# Patient Record
Sex: MALE | Race: BLACK OR AFRICAN AMERICAN
[De-identification: names, ages, dates, MRNs, and addresses within clinical notes are randomized per-mention and may not be internally consistent; named-entity substitution may affect disease eponyms.]

---

## 2021-09-18 ENCOUNTER — HOSPITAL ENCOUNTER (EMERGENCY)
Dept: HOSPITAL 7 - FB.ED | Age: 41
LOS: 1 days | Discharge: HOME | End: 2021-09-19
Payer: MEDICAID

## 2021-09-18 DIAGNOSIS — F17.210: ICD-10-CM

## 2021-09-18 DIAGNOSIS — I10: ICD-10-CM

## 2021-09-18 DIAGNOSIS — Z86.73: ICD-10-CM

## 2021-09-18 DIAGNOSIS — R10.32: Primary | ICD-10-CM

## 2021-09-18 PROCEDURE — 71046 X-RAY EXAM CHEST 2 VIEWS: CPT

## 2021-09-18 PROCEDURE — 74177 CT ABD & PELVIS W/CONTRAST: CPT

## 2021-09-18 PROCEDURE — 85025 COMPLETE CBC W/AUTO DIFF WBC: CPT

## 2021-09-18 PROCEDURE — 83880 ASSAY OF NATRIURETIC PEPTIDE: CPT

## 2021-09-18 PROCEDURE — 99285 EMERGENCY DEPT VISIT HI MDM: CPT

## 2021-09-18 PROCEDURE — 85379 FIBRIN DEGRADATION QUANT: CPT

## 2021-09-18 PROCEDURE — 84484 ASSAY OF TROPONIN QUANT: CPT

## 2021-09-18 PROCEDURE — 81001 URINALYSIS AUTO W/SCOPE: CPT

## 2021-09-18 PROCEDURE — 93005 ELECTROCARDIOGRAM TRACING: CPT

## 2021-09-18 PROCEDURE — 36415 COLL VENOUS BLD VENIPUNCTURE: CPT

## 2021-09-18 PROCEDURE — 71260 CT THORAX DX C+: CPT

## 2021-09-18 PROCEDURE — 80053 COMPREHEN METABOLIC PANEL: CPT

## 2021-09-18 NOTE — PCM.EKG
** #1 Interpretation


EKG Date: 09/18/21


Time: 22:17


EKG Interpretation Comments: 


Normal sinus rhythm, rate 77, normal axis, likely LVH, ST T-segment 

abnormalities in the inferior leads, nonpathological ST elevation in V1 and V2

## 2021-09-18 NOTE — EDM.PDOC
ED HPI GENERAL MEDICAL PROBLEM





- General


Chief Complaint: General


Stated Complaint: LEFT SIDE PAIN


Time Seen by Provider: 09/18/21 21:42


Source of Information: Reports: Patient


History Limitations: Reports: Language Barrier, Other (Patient is new to the 

area and is a relatively poor historian and cannot say exactly which medications

he takes)





- History of Present Illness


INITIAL COMMENTS - FREE TEXT/NARRATIVE: 


41-year-old gentleman new to the area came to the emergency department due to a 

2-day history of left sided abdominal/flank pain.  Past medical history is 

significant for a stroke 1 year ago that left him with weakness on the left side

of his body.  He states that he recently moved to the area and has been in the 

car quite a bit.  He also has been moving and lifting heavier objects than he 

normally does.  He states that the pain has been present for 2 days but was so 

severe last night that he did not sleep well.  He came to the emergency 

department today because he had difficulty sleeping and the pain has gotten 

actually worse.  Also complains of mild chest pain with difficulty breathing and

points to the midsternal area.  He denied fever, chills, upper respiratory 

symptoms, change in bowel or bladder habits.  He specifically denied dysuria and

hematuria.


He thinks that it is possible he may have strained or pulled a muscle but he is 

also concerned about blood clots.





  ** lateral side of the Left Lower Abdomen


Pain Score (Numeric/FACES): 8





- Related Data


                                    Allergies











Allergy/AdvReac Type Severity Reaction Status Date / Time


 


No Known Allergies Allergy   Verified 09/18/21 21:27











Home Meds: 


                                    Home Meds





. [Unable to Verify Home Med List]  09/18/21 [History]











Past Medical History


Cardiovascular History: Reports: Hypertension


Neurological History: Reports: CVA


Psychiatric History: Reports: Depression





Social & Family History





- Family History


Family Medical History: No Pertinent Family History





- Tobacco Use


Tobacco Use Status *Q: Current Every Day Tobacco User


Years of Tobacco use: 1


Packs/Tins Daily: 0.5





- Caffeine Use


Caffeine Use: Reports: Tea





- Recreational Drug Use


Recreational Drug Use: Yes


Recreational Drug Type: Reports: Marijuana/Hashish





ED ROS GENERAL





- Review of Systems


Review Of Systems: See Below


Constitutional: Reports: No Symptoms


HEENT: Reports: No Symptoms, Vertigo


Cardiovascular: Reports: Chest Pain


Endocrine: Reports: No Symptoms


GI/Abdominal: Reports: Abdominal Pain


: Reports: No Symptoms


Musculoskeletal: Reports: Muscle Pain


Skin: Reports: No Symptoms


Neurological: Reports: Pre-Existing Deficit, Weakness, Gait Disturbance


Psychiatric: Reports: No Symptoms


Hematologic/Lymphatic: Reports: No Symptoms


Immunologic: Reports: No Symptoms





ED EXAM, GENERAL





- Physical Exam


Exam: See Below


Exam Limited By: Language Barrier


General Appearance: Alert, Mild Distress


Eye Exam: Bilateral Eye: EOMI


Head: Atraumatic, Normocephalic


Respiratory/Chest: No Respiratory Distress, Lungs Clear


Cardiovascular: Regular Rate, Rhythm, No Murmur


GI/Abdominal: Normal Bowel Sounds, Tender


Back Exam: Normal Inspection.  No: CVA Tenderness (R), CVA Tenderness (L)


Extremities: Normal Inspection, No Pedal Edema


Neurological: Alert, Oriented, CN II-XII Intact, Normal Cognition, Abnormal 

Gait, Sensory/Motor Deficit, Other (Left-sided weakness)


Psychiatric: Normal Affect, Normal Mood


Skin Exam: Warm, Dry





Course





- Vital Signs


Text/Narrative:: 


Review of EKG shows normal sinus rhythm, rate 77, normal axis, likely LVH, 

nonpathological ST segment elevations in V1 and V2.  Patient was given 324 mg 

chewable aspirin.


Review of labs shows mildly elevated D-dimer and creatinine with GFR greater 

than 60.


You have chest x-ray shows no obvious acute process in the lungs, possible mild 

pulmonary congestion more prominent in the right lower lobe with cardiac size at

the upper limits of normal with prominent right ventricle.  Wells score of 3.


CT of chest, abdomen, pelvis showed no obvious acute abnormalities.


Chest x-ray did show more than normal air in the bowel as listed above.  

Patient's abdominal pain may be related to bowel gas.





Last Recorded V/S: 


                                Last Vital Signs











Temp  36.2 C   09/18/21 21:25


 


Pulse  87   09/18/21 21:25


 


Resp  15   09/18/21 21:25


 


BP  140/84   09/18/21 21:25


 


Pulse Ox  96   09/18/21 21:25














- Orders/Labs/Meds


Orders: 


                               Active Orders 24 hr











 Category Date Time Status


 


 CXR [Chest 2V] [CR] Stat Exams  09/18/21 22:00 Taken


 


 Chest Abdomen Pelvis w Cont [CT] Stat Exams  09/19/21 00:13 Taken


 


 Sodium Chloride 0.9% [Normal Saline] 1,000 ml Med  09/18/21 23:30 Active





 IV ASDIRECTED   


 


 EKG 12 Lead [EK] Routine Ther  09/18/21 21:59 Ordered








                                Medication Orders





Sodium Chloride (Normal Saline)  1,000 mls @ 999 mls/hr IV ASDIRECTED ALMAZ


   Last Admin: 09/18/21 23:23  Dose: 999 mls/hr


   Documented by: BLANKA








Labs: 


                                Laboratory Tests











  09/18/21 09/18/21 09/18/21 Range/Units





  22:10 22:10 22:10 


 


WBC   7.1   (3.2-10.1)  x10-3/uL


 


RBC   4.90   (3.90-5.90)  x10(6)uL


 


Hgb   14.7   (12.9-17.7)  g/dL


 


Hct   44.1   (38.3-50.1)  %


 


MCV   89.9   (80.8-98.7)  fL


 


MCH   29.9   (27.0-33.3)  pg


 


MCHC   33.3   (28.7-35.3)  g/dL


 


RDW   13.8   (12.4-15.0)  %


 


Plt Count   222   (117-477)  x10(3)uL


 


MPV   7.0   (6.7-11.0)  fL


 


Neut % (Auto)   69.4   (40.3-71.8)  %


 


Lymph % (Auto)   20.9   (15.8-45.3)  %


 


Mono % (Auto)   7.7   (5.5-15.2)  %


 


Eos % (Auto)   1.5   (0.1-6.8)  %


 


Baso % (Auto)   0.5   (0.3-3.8)  %


 


Neut # (Auto)   4.9   (1.7-6.9)  x10-3/uL


 


Lymph # (Auto)   1.5   (0.5-4.5)  x10-3/uL


 


Mono # (Auto)   0.5   (0.0-1.2)  x10-3/uL


 


Eos # (Auto)   0.1   (0.0-0.6)  x10-3/uL


 


Baso # (Auto)   0.0   (0.0-0.3)  x10-3/uL


 


D-Dimer, Quantitative  0.69 H    (0.0-0.59)  mg/LFEU


 


Sodium    141  (135-145)  mmol/L


 


Potassium    3.7  (3.5-5.3)  mmol/L


 


Chloride    102  (100-110)  mmol/L


 


Carbon Dioxide    31  (21-32)  mmol/L


 


BUN    17  (7-18)  mg/dL


 


Creatinine    1.4 H  (0.70-1.30)  mg/dL


 


Est Cr Clr Drug Dosing    62.66  mL/min


 


Estimated GFR (MDRD)    > 60  (>60)  


 


BUN/Creatinine Ratio    12.1  (9-20)  


 


Glucose    117 H  ()  mg/dL


 


Calcium    9.4  (8.6-10.2)  mg/dL


 


Total Bilirubin    0.6  (0.1-1.3)  mg/dL


 


AST    15  (5-25)  IU/L


 


ALT    43 H  (12-36)  U/L


 


Alkaline Phosphatase    76  ()  IU/L


 


Troponin I     (4.0-60.3)  pg/mL


 


NT-Pro-B Natriuret Pep     (<=125)  pg/mL


 


Total Protein    7.8  (6.0-8.0)  g/dL


 


Albumin    3.8  (3.5-5.2)  g/dL


 


Globulin    4.0  g/dL


 


Albumin/Globulin Ratio    1.0  


 


Urine Color     (YELLOW)  


 


Urine Appearance     (CLEAR)  


 


Urine pH     (5.0-6.5)  


 


Ur Specific Gravity     (1.010-1.025)  


 


Urine Protein     (NEGATIVE)  mg/dL


 


Urine Glucose (UA)     (NORMAL)  mg/dL


 


Urine Ketones     (NEGATIVE)  mg/dL


 


Urine Occult Blood     (NEGATIVE)  


 


Urine Nitrite     (NEGATIVE)  


 


Urine Bilirubin     (NEGATIVE)  


 


Urine Urobilinogen     (NEGATIVE)  mg/dL


 


Ur Leukocyte Esterase     (NEGATIVE)  


 


U Hyaline Cast (Auto)     (NS)  


 


Urine RBC     (0-5)  


 


Urine WBC     (0-5)  


 


Ur Squamous Epith Cells     (NS,R,O)  


 


Urine Bacteria     (NS)  


 


Urine Mucus     (NS)  














  09/18/21 09/18/21 Range/Units





  22:10 22:10 


 


WBC    (3.2-10.1)  x10-3/uL


 


RBC    (3.90-5.90)  x10(6)uL


 


Hgb    (12.9-17.7)  g/dL


 


Hct    (38.3-50.1)  %


 


MCV    (80.8-98.7)  fL


 


MCH    (27.0-33.3)  pg


 


MCHC    (28.7-35.3)  g/dL


 


RDW    (12.4-15.0)  %


 


Plt Count    (117-477)  x10(3)uL


 


MPV    (6.7-11.0)  fL


 


Neut % (Auto)    (40.3-71.8)  %


 


Lymph % (Auto)    (15.8-45.3)  %


 


Mono % (Auto)    (5.5-15.2)  %


 


Eos % (Auto)    (0.1-6.8)  %


 


Baso % (Auto)    (0.3-3.8)  %


 


Neut # (Auto)    (1.7-6.9)  x10-3/uL


 


Lymph # (Auto)    (0.5-4.5)  x10-3/uL


 


Mono # (Auto)    (0.0-1.2)  x10-3/uL


 


Eos # (Auto)    (0.0-0.6)  x10-3/uL


 


Baso # (Auto)    (0.0-0.3)  x10-3/uL


 


D-Dimer, Quantitative    (0.0-0.59)  mg/LFEU


 


Sodium    (135-145)  mmol/L


 


Potassium    (3.5-5.3)  mmol/L


 


Chloride    (100-110)  mmol/L


 


Carbon Dioxide    (21-32)  mmol/L


 


BUN    (7-18)  mg/dL


 


Creatinine    (0.70-1.30)  mg/dL


 


Est Cr Clr Drug Dosing    mL/min


 


Estimated GFR (MDRD)    (>60)  


 


BUN/Creatinine Ratio    (9-20)  


 


Glucose    ()  mg/dL


 


Calcium    (8.6-10.2)  mg/dL


 


Total Bilirubin    (0.1-1.3)  mg/dL


 


AST    (5-25)  IU/L


 


ALT    (12-36)  U/L


 


Alkaline Phosphatase    ()  IU/L


 


Troponin I  5.9   (4.0-60.3)  pg/mL


 


NT-Pro-B Natriuret Pep  24   (<=125)  pg/mL


 


Total Protein    (6.0-8.0)  g/dL


 


Albumin    (3.5-5.2)  g/dL


 


Globulin    g/dL


 


Albumin/Globulin Ratio    


 


Urine Color   Yellow  (YELLOW)  


 


Urine Appearance   Slightly cloudy  (CLEAR)  


 


Urine pH   5.0  (5.0-6.5)  


 


Ur Specific Gravity   1.030 H  (1.010-1.025)  


 


Urine Protein   Negative  (NEGATIVE)  mg/dL


 


Urine Glucose (UA)   Normal  (NORMAL)  mg/dL


 


Urine Ketones   Negative  (NEGATIVE)  mg/dL


 


Urine Occult Blood   Negative  (NEGATIVE)  


 


Urine Nitrite   Negative  (NEGATIVE)  


 


Urine Bilirubin   Negative  (NEGATIVE)  


 


Urine Urobilinogen   1 H  (NEGATIVE)  mg/dL


 


Ur Leukocyte Esterase   Negative  (NEGATIVE)  


 


U Hyaline Cast (Auto)   Rare H  (NS)  


 


Urine RBC   0-5  (0-5)  


 


Urine WBC   0-5  (0-5)  


 


Ur Squamous Epith Cells   Occasional  (NS,R,O)  


 


Urine Bacteria   Few H  (NS)  


 


Urine Mucus   Rare H  (NS)  











Meds: 


Medications











Generic Name Dose Route Start Last Admin





  Trade Name Freq  PRN Reason Stop Dose Admin


 


Sodium Chloride  1,000 mls @ 999 mls/hr  09/18/21 23:30  09/18/21 23:23





  Normal Saline  IV   999 mls/hr





  ASDIRECTED ALMAZ   Administration














Discontinued Medications














Generic Name Dose Route Start Last Admin





  Trade Name Freq  PRN Reason Stop Dose Admin


 


Aspirin  324 mg  09/18/21 22:34  09/18/21 22:52





  Aspirin 81 Mg Tab.Chew  PO  09/18/21 22:35  324 mg





  ONETIME ONE   Administration


 


Iopamidol  75 ml  09/18/21 23:31  09/19/21 00:24





  Iopamidol 755 Mg/Ml 75 Ml Bottle  IV  09/18/21 23:32  Not Given





  ASDIRECTED ONE  


 


Iopamidol  100 ml  09/19/21 00:23  09/19/21 00:42





  Iopamidol 755 Mg/Ml 100 Ml Bottle  IV  09/19/21 00:24  100 ml





  .AS DIRECTED ONE   Administration














Departure





- Departure


Time of Disposition: 01:55


Disposition: Home, Self-Care 01


Condition: Good


Clinical Impression: 


 Abdominal pain








- Discharge Information


*PRESCRIPTION DRUG MONITORING PROGRAM REVIEWED*: Not Applicable


*COPY OF PRESCRIPTION DRUG MONITORING REPORT IN PATIENT QUYEN: Not Applicable


Instructions:  Flank Pain, Adult, Easy-to-Read, Abdominal Pain, Adult, Easy-to-

Read


Referrals: 


PCP,None [Primary Care Provider] - 


Forms:  ED Department Discharge


Additional Instructions: 


Pulmonary embolism and other acute findings have been ruled out.  I advised the 

patient to drink plenty of fluids and try using stool softeners or MiraLAX to 

have soft, well-formed, easy to pass bowel movements.  I advised the patient 

that laboratory testing showed that he had a mildly elevated creatinine.  He has

no history of renal disease.  I advised him to follow-up with his primary care 

physician.





Sepsis Event Note (ED)





- Evaluation


Sepsis Screening Result: No Definite Risk





- Focused Exam


Vital Signs: 


                                   Vital Signs











  Temp Pulse Resp BP Pulse Ox


 


 09/18/21 21:25  36.2 C  87  15  140/84  96














- My Orders


Last 24 Hours: 


My Active Orders





09/18/21 21:59


EKG 12 Lead [EK] Routine 





09/18/21 22:00


CXR [Chest 2V] [CR] Stat 





09/18/21 23:30


Sodium Chloride 0.9% [Normal Saline] 1,000 ml IV ASDIRECTED 





09/19/21 00:13


Chest Abdomen Pelvis w Cont [CT] Stat 














- Assessment/Plan


Last 24 Hours: 


My Active Orders





09/18/21 21:59


EKG 12 Lead [EK] Routine 





09/18/21 22:00


CXR [Chest 2V] [CR] Stat 





09/18/21 23:30


Sodium Chloride 0.9% [Normal Saline] 1,000 ml IV ASDIRECTED 





09/19/21 00:13


Chest Abdomen Pelvis w Cont [CT] Stat

## 2021-10-15 ENCOUNTER — HOSPITAL ENCOUNTER (EMERGENCY)
Dept: HOSPITAL 7 - FB.ED | Age: 41
Discharge: NURSING, REHAB OR LONG-TERM CARE | End: 2021-10-15
Payer: MEDICAID

## 2021-10-15 VITALS — SYSTOLIC BLOOD PRESSURE: 144 MMHG | DIASTOLIC BLOOD PRESSURE: 90 MMHG | HEART RATE: 148 BPM

## 2021-10-15 DIAGNOSIS — I48.91: ICD-10-CM

## 2021-10-15 DIAGNOSIS — Z72.0: ICD-10-CM

## 2021-10-15 DIAGNOSIS — Z86.73: ICD-10-CM

## 2021-10-15 DIAGNOSIS — I63.9: Primary | ICD-10-CM

## 2021-10-15 DIAGNOSIS — I10: ICD-10-CM

## 2021-10-15 DIAGNOSIS — E11.65: ICD-10-CM

## 2021-10-15 DIAGNOSIS — E87.6: ICD-10-CM

## 2021-10-15 PROCEDURE — C9113 INJ PANTOPRAZOLE SODIUM, VIA: HCPCS

## 2021-10-15 NOTE — EDM.PDOC
ED HPI GENERAL MEDICAL PROBLEM





- General


Stated Complaint: Atrial fibrillation


Time Seen by Provider: 10/15/21 15:00


Source of Information: Reports: Patient


History Limitations: Reports: Altered Mental Status (Is unable to give history 

but he is lethargic and slow to answer questions.)





- History of Present Illness


INITIAL COMMENTS - FREE TEXT/NARRATIVE: 





41-year-old male who presents to the emergency department via ambulance from his

home with complaints of dizziness and vomiting. He patient is rather lethargic 

and it is difficult to get a history from him. He is slow to respond and he 

speaks in short sentences but I am able to get some history from him. He reports

that at 8 AM, he awoke and he was feeling very dizzy. He reports he was feeling 

that the room was spinning. He also felt very tired and he had a total head 

headache. He reports it was dull and throbbing and he really couldn't quantitate

it or qualitate it anymore than this. He reports that he has been in bed pretty 

much since then. Beginning at about noon he started to have nausea with vomiting

and he has had multiple episodes of vomiting he reports that he tried to get up 

to walk and he fell. There was no loss of consciousness but he just could not 

walk and maintain his balance. In. He denies any shortness of breath. The 

patient tells me that he felt his normal self yesterday. He had no headache or 

vision problems or any dizziness at that time. This is really all of the history

that I can obtain from the patient. There are no other associated signs or 

symptoms. There are no other modifying factors.


Onset: Today


Duration: Constant (ADM), Getting Worse


Location: Reports: Head


Quality: Reports: Ache


Improves with: Reports: None


Worsens with: Reports: None


Associated Symptoms: Reports: No Other Symptoms (Except as above.)


Treatments PTA: Reports: Other Medication(s) (Zofran given IV in the ambulance.)





- Related Data


                                    Allergies











Allergy/AdvReac Type Severity Reaction Status Date / Time


 


No Known Allergies Allergy   Verified 10/15/21 15:19











Home Meds: 


                                    Home Meds





. [Unable to Verify Home Med List]  09/18/21 [History]











Past Medical History


Cardiovascular History: Reports: Hypertension


Neurological History: Reports: Cerebral Aneurysms, CVA


Psychiatric History: Reports: Depression


Endocrine/Metabolic History: Reports: Diabetes, Type II


Immunologic History: Reports: HIV (Patient has been HIV positive for the past 

20-30 years.)





- Past Surgical History


Other Neurological Surgeries/Procedures: Craniotomy for aneurysm clipping.  

shunt.





Social & Family History





- Tobacco Use


Tobacco Use Status *Q: Current Every Day Tobacco User





- Caffeine Use


Caffeine Use: Reports: Tea





- Alcohol Use


Alcohol Use History: Yes


Alcohol Use Frequency: Socially





- Living Situation & Occupation


Living situation: Reports: Single


Occupation: Employed


Social History Comment: Patient just recently moved from [place] 2 months ago.





ED ROS GENERAL





- Review of Systems


Review Of Systems: See Below


Constitutional: Denies: Fever, Chills


HEENT: Denies: Eye Pain


Respiratory: Denies: Shortness of Breath, Cough


Cardiovascular: Reports: Lightheadedness.  Denies: Chest Pain


GI/Abdominal: Reports: Nausea, Vomiting.  Denies: Abdominal Pain


: Reports: Incontinence (Patient was incontinent of urine per EMS.).  Denies: 

Dysuria


Musculoskeletal: Denies: Neck Pain, Back Pain


Skin: Reports: Diaphoresis.  Denies: Rash


Neurological: Reports: Confusion, Dizziness, Headache, Difficulty Walking, Gait 

Disturbance


Hematologic/Lymphatic: Denies: Easy Bleeding, Easy Bruising





ED EXAM, GENERAL





- Physical Exam


Exam: See Below


Exam Limited By: No Limitations


General Appearance: WD/WN, Lethargic, Moderate Distress (Active emesis during my

 exam.)


Eye Exam: Right Eye: Other (Sclera are anicteric; right pupil is about 4 mm and 

sluggish. Left pupil is about 2 mm), Bilateral Eye: Nystagmus (Horizontal 

nystagmus bilaterally.)


Ears: Normal External Exam


Ear Exam: Bilateral Ear: Auricle Normal


Nose: Normal Inspection, Normal Mucosa, No Blood


Throat/Mouth: Normal Voice, No Airway Compromise, Other (Driving his membranes.)


Head: Atraumatic, Normocephalic, Other (He has had previous craniotomy on the 

left.)


Neck: Normal Inspection, Supple, Non-Tender, Full Range of Motion


Respiratory/Chest: No Respiratory Distress, Lungs Clear, Normal Breath Sounds, 

No Accessory Muscle Use, Chest Non-Tender


Cardiovascular: No Edema, No Murmur, Tachycardia, Irregularly Irregular


Peripheral Pulses: 2+: Brachial (R), Radial (L), Dorsalis Pedis (L), Dorsalis 

Pedis (R)


GI/Abdominal: Normal Bowel Sounds, Soft, Non-Tender, No Mass


Back Exam: Normal Inspection


Extremities: Normal Inspection, Normal Range of Motion, Non-Tender, No Pedal 

Edema, Normal Capillary Refill


Neurological: CN II-XII Intact, Inattentive, Slow to Respond, Other (Left sided 

weakness which is chronic. He appears to be 4/5 in both left upper and lower 

extremity compared to 5/5 on the right)


Psychiatric: Flat Affect


Skin Exam: Intact, Normal Color, No Rash, Diaphoretic


  ** #1 Interpretation


EKG Date: 10/15/21


Time: 14:48


Rhythm: A-Fib


Rate (Beats/Min): 158


Axis: Normal


P-Wave: Absent


QRS: Normal


ST-T: Other (LVH with repolarization abnormality.)


QT: Normal


Comparison: Change From Previous EKG (Compared to an EKG on 9/18/2021, the A. 

fib with RVR is new.)





Course





- Vital Signs


Last Recorded V/S: 


                                Last Vital Signs











Temp  36.6 C   10/15/21 14:50


 


Pulse  148 H  10/15/21 14:50


 


Resp  18   10/15/21 14:50


 


BP  144/90 H  10/15/21 14:50


 


Pulse Ox  97   10/15/21 14:50














- Orders/Labs/Meds


Labs: 


                                Laboratory Tests











  10/15/21 10/15/21 10/15/21 Range/Units





  14:55 14:58 14:58 


 


WBC   7.6   (3.2-10.1)  x10-3/uL


 


RBC   4.96   (3.90-5.90)  x10(6)uL


 


Hgb   14.7   (12.9-17.7)  g/dL


 


Hct   44.8   (38.3-50.1)  %


 


MCV   90.3   (80.8-98.7)  fL


 


MCH   29.6   (27.0-33.3)  pg


 


MCHC   32.8   (28.7-35.3)  g/dL


 


RDW   13.6   (12.4-15.0)  %


 


Plt Count   208   (117-477)  x10(3)uL


 


MPV   7.2   (6.7-11.0)  fL


 


Neut % (Auto)   55.3   (40.3-71.8)  %


 


Lymph % (Auto)   32.9   (15.8-45.3)  %


 


Mono % (Auto)   9.6   (5.5-15.2)  %


 


Eos % (Auto)   1.8   (0.1-6.8)  %


 


Baso % (Auto)   0.4   (0.3-3.8)  %


 


Neut # (Auto)   4.2   (1.7-6.9)  x10-3/uL


 


Lymph # (Auto)   2.5   (0.5-4.5)  x10-3/uL


 


Mono # (Auto)   0.7   (0.0-1.2)  x10-3/uL


 


Eos # (Auto)   0.1   (0.0-0.6)  x10-3/uL


 


Baso # (Auto)   0.0   (0.0-0.3)  x10-3/uL


 


PT    10.3  (9.0-11.1)  sec


 


INR    0.95 L  (1.00-1.24)  


 


APTT    20.0 L  (24.4-33.2)  SECONDS


 


Sodium     (135-145)  mmol/L


 


Potassium     (3.5-5.3)  mmol/L


 


Chloride     (100-110)  mmol/L


 


Carbon Dioxide     (21-32)  mmol/L


 


BUN     (7-18)  mg/dL


 


Creatinine     (0.70-1.30)  mg/dL


 


Est Cr Clr Drug Dosing     


 


Estimated GFR (MDRD)     (>60)  


 


BUN/Creatinine Ratio     (9-20)  


 


Glucose     ()  mg/dL


 


POC Glucose  198 H    ()  mg/dL


 


Calcium     (8.6-10.2)  mg/dL


 


Magnesium     (1.8-2.5)  mg/dL


 


Total Bilirubin     (0.1-1.3)  mg/dL


 


AST     (5-25)  IU/L


 


ALT     (12-36)  U/L


 


Alkaline Phosphatase     ()  IU/L


 


Troponin I     (4.0-60.3)  pg/mL


 


Total Protein     (6.0-8.0)  g/dL


 


Albumin     (3.5-5.2)  g/dL


 


Globulin     g/dL


 


Albumin/Globulin Ratio     


 


Urine Color     (YELLOW)  


 


Urine Appearance     (CLEAR)  


 


Urine pH     (5.0-6.5)  


 


Ur Specific Gravity     (1.010-1.025)  


 


Urine Protein     (NEGATIVE)  mg/dL


 


Urine Glucose (UA)     (NORMAL)  mg/dL


 


Urine Ketones     (NEGATIVE)  mg/dL


 


Urine Occult Blood     (NEGATIVE)  


 


Urine Nitrite     (NEGATIVE)  


 


Urine Bilirubin     (NEGATIVE)  


 


Urine Urobilinogen     (NEGATIVE)  mg/dL


 


Ur Leukocyte Esterase     (NEGATIVE)  


 


Urine RBC     (0-5)  


 


Urine WBC     (0-5)  


 


Ur Squamous Epith Cells     (NS,R,O)  


 


Urine Bacteria     (NS)  














  10/15/21 10/15/21 10/15/21 Range/Units





  14:58 14:58 17:18 


 


WBC     (3.2-10.1)  x10-3/uL


 


RBC     (3.90-5.90)  x10(6)uL


 


Hgb     (12.9-17.7)  g/dL


 


Hct     (38.3-50.1)  %


 


MCV     (80.8-98.7)  fL


 


MCH     (27.0-33.3)  pg


 


MCHC     (28.7-35.3)  g/dL


 


RDW     (12.4-15.0)  %


 


Plt Count     (117-477)  x10(3)uL


 


MPV     (6.7-11.0)  fL


 


Neut % (Auto)     (40.3-71.8)  %


 


Lymph % (Auto)     (15.8-45.3)  %


 


Mono % (Auto)     (5.5-15.2)  %


 


Eos % (Auto)     (0.1-6.8)  %


 


Baso % (Auto)     (0.3-3.8)  %


 


Neut # (Auto)     (1.7-6.9)  x10-3/uL


 


Lymph # (Auto)     (0.5-4.5)  x10-3/uL


 


Mono # (Auto)     (0.0-1.2)  x10-3/uL


 


Eos # (Auto)     (0.0-0.6)  x10-3/uL


 


Baso # (Auto)     (0.0-0.3)  x10-3/uL


 


PT     (9.0-11.1)  sec


 


INR     (1.00-1.24)  


 


APTT     (24.4-33.2)  SECONDS


 


Sodium  143    (135-145)  mmol/L


 


Potassium  2.9 L    (3.5-5.3)  mmol/L


 


Chloride  104    (100-110)  mmol/L


 


Carbon Dioxide  28    (21-32)  mmol/L


 


BUN  16    (7-18)  mg/dL


 


Creatinine  1.3    (0.70-1.30)  mg/dL


 


Est Cr Clr Drug Dosing  TNP    


 


Estimated GFR (MDRD)  > 60    (>60)  


 


BUN/Creatinine Ratio  12.3    (9-20)  


 


Glucose  195 H    ()  mg/dL


 


POC Glucose     ()  mg/dL


 


Calcium  8.7    (8.6-10.2)  mg/dL


 


Magnesium  2.0    (1.8-2.5)  mg/dL


 


Total Bilirubin  0.6    (0.1-1.3)  mg/dL


 


AST  8  D    (5-25)  IU/L


 


ALT  28  D    (12-36)  U/L


 


Alkaline Phosphatase  78    ()  IU/L


 


Troponin I   5.8   (4.0-60.3)  pg/mL


 


Total Protein  7.6    (6.0-8.0)  g/dL


 


Albumin  3.7    (3.5-5.2)  g/dL


 


Globulin  3.9    g/dL


 


Albumin/Globulin Ratio  1.0    


 


Urine Color    Yellow  (YELLOW)  


 


Urine Appearance    Clear  (CLEAR)  


 


Urine pH    6.0  (5.0-6.5)  


 


Ur Specific Gravity    1.015  (1.010-1.025)  


 


Urine Protein    Negative  (NEGATIVE)  mg/dL


 


Urine Glucose (UA)    >1000 H  (NORMAL)  mg/dL


 


Urine Ketones    Negative  (NEGATIVE)  mg/dL


 


Urine Occult Blood    Negative  (NEGATIVE)  


 


Urine Nitrite    Negative  (NEGATIVE)  


 


Urine Bilirubin    Negative  (NEGATIVE)  


 


Urine Urobilinogen    Normal  (NEGATIVE)  mg/dL


 


Ur Leukocyte Esterase    Negative  (NEGATIVE)  


 


Urine RBC    0-5  (0-5)  


 


Urine WBC    0-5  (0-5)  


 


Ur Squamous Epith Cells    Occasional  (NS,R,O)  


 


Urine Bacteria    Rare H  (NS)  











Meds: 


Medications














Discontinued Medications














Generic Name Dose Route Start Last Admin





  Trade Name Freq  PRN Reason Stop Dose Admin


 


Diltiazem HCl  20 mg  10/15/21 15:26  10/15/21 15:48





  Diltiazem 25 Mg/5 Ml Sdv  IVPUSH  10/15/21 15:27  20 mg





  ONETIME ONE   Administration


 


Diltiazem HCl  20 mg  10/15/21 16:15  10/15/21 17:00





  Diltiazem 25 Mg/5 Ml Sdv  IVPUSH  10/15/21 16:16  20 mg





  ONETIME ONE   Administration


 


Sodium Chloride  1,000 mls @ 125 mls/hr  10/15/21 15:45  10/15/21 17:10





  Normal Saline  IV   125 mls/hr





  ASDIRECTED ALMAZ   Administration


 


Diltiazem HCl 125 mg/ Sodium  125 mls @ 5 mls/hr  10/15/21 16:15  10/15/21 17:31





  Chloride  IV   10 mg/hr





  TITRATE ALMAZ   10 mls/hr





    Titration





  Protocol  





  5 MG/HR  


 


Metoclopramide HCl  10 mg  10/15/21 15:19  10/15/21 15:21





  Metoclopramide 10 Mg/2 Ml Sdv  IVPUSH  10/15/21 15:20  10 mg





  ONETIME ONE   Administration


 


Pantoprazole Sodium  40 mg  10/15/21 17:23  10/15/21 17:31





  Pantoprazole 40 Mg Vial  IVPUSH  10/15/21 17:24  40 mg





  ONETIME ONE   Administration


 


Sodium Chloride  10 ml  10/15/21 15:21 





  Sodium Chloride 0.9% 10 Ml Syringe  FLUSH  





  ASDIRECTED PRN  





  Keep Vein Open  














- Radiology Interpretation


Free Text/Narrative:: 





CT scan of the head showed no acute intracranial hemorrhage or mass effect. 

There is a right frontal  shunt place. There are small age indeterminate 

lacunar infarcts within the cerebellar hemispheres there are multiple chronic i

nfarctions within the right cerebral hemisphere. This was per the [facility] 

radiologist.





- Re-Assessments/Exams


Free Text/Narrative Re-Assessment/Exam: 





10/15/21 15:35: The patient was given diltiazem 20 mg IV. The patient is being 

sent for a CT scan now. White blood cell count is 7.6. Hemoglobin is 14.7. 

Platelet count is normal. 95. PTT is 20. Sodium is 143. Potassium is 2.9. 

Bicarbonate is 28. BUN is 16 and creatinine is 1.3. Glucose is 195. LFTs are 

normal. The patient has also been given Reglan 10 mg IV. He remains tachycardic 

in the 140-160 range with A. fib/RVR. I am awaiting the CT scan of the head.





10/15/21 16:15: CT scan of the head shows no definite bleed per my read. I'm 

awaiting the official result. The patient is still in A. fib with RVR with a 

rate in the 140-160 range after the 20 mg of diltiazem initially. I will give 

the patient diltiazem 20 more milligrams IV and placed him on a diltiazem drip. 

The patient did have another episode of emesis but he did feel better after this

 and reports no nausea present.





10/15/21 17:20: I called and discussed the patient's case with Dr. Haynes, 

stroke neurologist at present in [place], and he felt that the patient was likely 

having a cerebellar or brainstem infarct and felt the patient should be 

transferred to or further evaluation and immediate MRI. The patient has just 

been placed on the diltiazem drip and his pulse rate is in the 120s still. He 

that up the patient will need to be transferred via ALS ambulance to [facility] in 

[place] emergency department for the above and for further evaluation and 

admission to their hospital with appropriate interventions as deemed necessary.





10/15/21 17:45: EMS is her to transport the patient. The patient's urine came 

back negative except for glucose in the urine. The patient remains 

neurologically stable with continued lethargy but verbally responsive. He is 

moving all 4 extremities with the left upper and lower she may being weaker than

 the right. He currently reports no nausea. Heart rate is still in the 120 range

 and he is on a diltiazem drip. Blood pressure was also 159/92. I discussed all 

of this stuff with the patient and he is in agreement with plan for transfer to 

[facility] in [place].








Departure





- Departure


Time of Disposition: 17:45


Disposition: DC/Tfer to Acute Hospital 02


Reason for Transfer *Q: Other (Patient with acute stroke and needing 

interventional stroke neurologist.)


Condition: Critical


Clinical Impression: 


 Atrial fibrillation with RVR, Acute CVA (cerebrovascular accident), Hypokalemia





Diabetes mellitus type 2, uncontrolled


Qualifiers:


 Glycemic state: with hyperglycemia Qualified Code(s): E11.65 - Type 2 diabetes 

mellitus with hyperglycemia





Referrals: 


PCP,None [Primary Care Provider] - 


Forms:  ED Department Discharge





Critical Care Note





- Critical Care Note


Total Time (mins): 80


Comments: 





Patient attended closely initially and following CT scan. Multiple interventions

made. Total critical care time spent with the patient was 80 minutes.